# Patient Record
Sex: MALE | Race: ASIAN | NOT HISPANIC OR LATINO
[De-identification: names, ages, dates, MRNs, and addresses within clinical notes are randomized per-mention and may not be internally consistent; named-entity substitution may affect disease eponyms.]

---

## 2021-10-04 ENCOUNTER — APPOINTMENT (OUTPATIENT)
Dept: CARDIOLOGY | Facility: CLINIC | Age: 61
End: 2021-10-04

## 2021-10-26 ENCOUNTER — APPOINTMENT (OUTPATIENT)
Dept: UROLOGY | Facility: CLINIC | Age: 61
End: 2021-10-26
Payer: COMMERCIAL

## 2021-10-26 VITALS
WEIGHT: 204 LBS | RESPIRATION RATE: 16 BRPM | HEIGHT: 70 IN | TEMPERATURE: 98 F | OXYGEN SATURATION: 99 % | HEART RATE: 54 BPM | SYSTOLIC BLOOD PRESSURE: 112 MMHG | BODY MASS INDEX: 29.2 KG/M2 | DIASTOLIC BLOOD PRESSURE: 70 MMHG

## 2021-10-26 DIAGNOSIS — Z87.891 PERSONAL HISTORY OF NICOTINE DEPENDENCE: ICD-10-CM

## 2021-10-26 DIAGNOSIS — Z78.9 OTHER SPECIFIED HEALTH STATUS: ICD-10-CM

## 2021-10-26 LAB
ANION GAP SERPL CALC-SCNC: 14 MMOL/L
APPEARANCE: CLEAR
BACTERIA: NEGATIVE
BILIRUBIN URINE: NEGATIVE
BLOOD URINE: NEGATIVE
BUN SERPL-MCNC: 17 MG/DL
CALCIUM SERPL-MCNC: 9.3 MG/DL
CHLORIDE SERPL-SCNC: 103 MMOL/L
CO2 SERPL-SCNC: 22 MMOL/L
COLOR: COLORLESS
CREAT SERPL-MCNC: 0.64 MG/DL
ESTRADIOL SERPL-MCNC: 17 PG/ML
GLUCOSE QUALITATIVE U: NEGATIVE
GLUCOSE SERPL-MCNC: 92 MG/DL
HYALINE CASTS: 0 /LPF
KETONES URINE: NEGATIVE
LEUKOCYTE ESTERASE URINE: NEGATIVE
LH SERPL-ACNC: 6.6 IU/L
MICROSCOPIC-UA: NORMAL
NITRITE URINE: NEGATIVE
PH URINE: 6
POTASSIUM SERPL-SCNC: 4.2 MMOL/L
PROLACTIN SERPL-MCNC: 10.3 NG/ML
PROTEIN URINE: NEGATIVE
PSA FREE FLD-MCNC: 18 %
PSA FREE SERPL-MCNC: 0.66 NG/ML
PSA SERPL-MCNC: 3.69 NG/ML
RED BLOOD CELLS URINE: 0 /HPF
SODIUM SERPL-SCNC: 140 MMOL/L
SPECIFIC GRAVITY URINE: 1.01
SQUAMOUS EPITHELIAL CELLS: 0 /HPF
UROBILINOGEN URINE: NORMAL
WHITE BLOOD CELLS URINE: 0 /HPF

## 2021-10-26 PROCEDURE — 99204 OFFICE O/P NEW MOD 45 MIN: CPT

## 2021-10-26 RX ORDER — LOSARTAN POTASSIUM 100 MG/1
100 TABLET, FILM COATED ORAL
Refills: 0 | Status: ACTIVE | COMMUNITY

## 2021-10-26 NOTE — ADDENDUM
[FreeTextEntry1] : Entered by Brandee Carter, acting as scribe for Dr. Richard Jacques.\par \par The documentation recorded by the scribe accurately reflects the service I personally performed and the decisions made by me.

## 2021-10-26 NOTE — LETTER BODY
[FreeTextEntry1] : Daniel Johnson MD \par [address], \[place] [address]\par [phone]\par \par Dear Dr. Johnson,\par \par Reason for Visit: BPH. Urinary urgency. Erectile dysfunction.\par \par This is a 60 year-old Mandarin-speaking gentleman with symptoms of BPH and erectile dysfunction. Patient is here today for evaluation. Patient reports he has weak uroflow, frequency, and urinary urgency. He denies any hematuria or urinary incontinence. His symptoms are aggravated by hydration. He denies any alleviating factors. He has not tried any medical therapy previously. He denies any pain. Patient also reports erectile dysfunction. He has normal libido and sex drive. However he notes decreased penile tumescence. He is currently taking Losartan for his blood pressure. All other review of systems are negative. He has no cancer in his family medical history. He has previously undergone an appendectomy.  Past medical history, family history and social history were inquired and were noncontributory to current condition. The patient does not use tobacco or drink alcohol. Medications and allergies were reviewed. He has no known allergies to medication. \par \par On examination, the patient is a healthy-appearing gentleman in no acute distress. He is alert and oriented follows commands. He has normal mood and affect. He is normocephalic. Neck is supple. Oral no thrush Respirations are unlabored. His abdomen is soft and nontender. Bladder is nonpalpable. No CVA tenderness. Neurologically he is grossly intact. No peripheral edema. Skin without gross abnormality. He has normal male external genitalia. Normal meatus. Bilateral testes are descended intrascrotally and normal to palpation. On rectal examination, there is normal sphincter tone. The prostate is clinically benign without focal induration or nodularity. \par \par ASSESSMENT: BPH. Urinary urgency. Erectile dysfunction.\par \par I counseled the patient on the various etiology of his symptoms. I discussed the natural history of BPH and the treatment options available. I discussed the options of conservative management with fluid in dietary restrictions, herbal therapy, medical therapy, and minimally invasive procedures.  Risk and benefits were discussed. I answered his questions. I recommended he try Flomax. I discussed the potential side effects of the medication. I counseled the patient on its use and side effects. If the patient develops any side effects, the patient will discontinue the medication and contact me. He will obtain BMP, PSA and urinalysis to establish baselines. In terms of his erectile dysfunction, I counseled the patient. I discussed the various etiologies of erectile dysfunction. I recommended that he obtained a male hormone panel with testosterone, free testosterone, estradiol, prolactin, and LH level. Risks and alternatives were discussed. I answered the patient questions. The patient will follow-up as directed and will contact me with any questions or concerns. Thank you for the opportunity to participate in the care of Mr. CHOWDHURY. I will keep you updated on his progress.\par \par Plan: Trial of Flomax. Male hormone panel. BMP. PSA. Urinalysis. Follow up in 1 month.

## 2021-10-26 NOTE — HISTORY OF PRESENT ILLNESS
[FreeTextEntry1] : Please refer to URO Consult note \par \par new male pt \par BPH and ED \par nocturia frequent urination - trial of Flomax \par ED blood pressure meds \par male hormone panel \par consider Viagra \par

## 2021-10-27 LAB
TESTOST BND SERPL-MCNC: 15.6 PG/ML
TESTOSTERONE TOTAL S: 647 NG/DL

## 2021-11-30 ENCOUNTER — APPOINTMENT (OUTPATIENT)
Dept: UROLOGY | Facility: CLINIC | Age: 61
End: 2021-11-30
Payer: COMMERCIAL

## 2021-11-30 VITALS
WEIGHT: 204 LBS | HEART RATE: 65 BPM | RESPIRATION RATE: 16 BRPM | DIASTOLIC BLOOD PRESSURE: 74 MMHG | BODY MASS INDEX: 29.27 KG/M2 | TEMPERATURE: 97.7 F | SYSTOLIC BLOOD PRESSURE: 124 MMHG | OXYGEN SATURATION: 98 %

## 2021-11-30 DIAGNOSIS — Z00.00 ENCOUNTER FOR GENERAL ADULT MEDICAL EXAMINATION W/OUT ABNORMAL FINDINGS: ICD-10-CM

## 2021-11-30 PROCEDURE — 99214 OFFICE O/P EST MOD 30 MIN: CPT

## 2021-11-30 NOTE — HISTORY OF PRESENT ILLNESS
[FreeTextEntry1] : Please refer to URO Consult note \par \par FUA BPH \par symptoms improved on Flomax \par ED -  male hormone panel normal \par trial of Viagra \par come back in 1 yr \par

## 2021-11-30 NOTE — LETTER BODY
[FreeTextEntry1] : Daniel Johnson MD \par [address], \[place] [address]\par [phone]\par \par Dear Dr. Johnson,\par \par Reason for Visit: BPH. Urinary urgency. Erectile dysfunction.\par \par This is a 61 year-old Mandarin-speaking gentleman with symptoms of BPH and erectile dysfunction. Patient returns today for follow up. Since he was last seen, the patient reports taking Flomax regularly without any side effects or difficulties with the medication. He reports good uroflow and improved urinary symptoms.  He denies any hematuria or urinary incontinence. He denies any pain. Patient also reports erectile dysfunction. He has normal libido and sex drive. However he notes decreased penile tumescence. His male hormone panel was unremarkable. He is currently taking Losartan for his blood pressure.  All other review of systems are negative. Past medical history, family history and social history were inquired and were noncontributory to current condition. Medications and allergies were reviewed. He has no known allergies to medication. \par \par On examination, the patient is a healthy-appearing gentleman in no acute distress. He is alert and oriented follows commands. He has normal mood and affect. He is normocephalic. Neck is supple. Oral no thrush Respirations are unlabored. His abdomen is soft and nontender. Bladder is nonpalpable. No CVA tenderness. Neurologically he is grossly intact. No peripheral edema. Skin without gross abnormality. He has normal male external genitalia. Normal meatus. Bilateral testes are descended intrascrotally and normal to palpation. On rectal examination, there is normal sphincter tone. The prostate is clinically benign without focal induration or nodularity. \par \par His BMP demonstrated normal renal functions, creatinine 0.64. His PSA was 3.69, which is within normal limits. His urinalysis was unremarkable. His male hormone panel was unremarkable. \par \par ASSESSMENT: BPH, symptoms improved on Flomax. Erectile dysfunction.\par \par I counseled the patient. In terms of his BPH, the patient reports improved urinary symptoms on medical therapy. I recommended he continue taking Flomax. I renewed the patient's prescription for Flomax today. I encouraged the patient to continue medications regularly as directed.  In terms of his erectile dysfunction, his male hormone panel was unremarkable. I recommended the patient begin a trial of Viagra PRN. I discussed the potential side effects of the medication. I counseled the patient on its use and side effects. If the patient develops any side effects, the patient will discontinue the medication and contact me. Risks and alternatives were discussed. I answered the patient questions. The patient will follow-up as directed and will contact me with any questions or concerns. Thank you for the opportunity to participate in the care of Mr. CHOWDHURY. I will keep you updated on his progress. \par \par Plan: Trial of Viagra PRN. Continue Flomax. Follow up as directed.

## 2022-03-29 ENCOUNTER — APPOINTMENT (OUTPATIENT)
Dept: CARDIOLOGY | Facility: CLINIC | Age: 62
End: 2022-03-29
Payer: COMMERCIAL

## 2022-03-29 VITALS
BODY MASS INDEX: 29.7 KG/M2 | HEART RATE: 75 BPM | SYSTOLIC BLOOD PRESSURE: 141 MMHG | WEIGHT: 207 LBS | RESPIRATION RATE: 18 BRPM | DIASTOLIC BLOOD PRESSURE: 83 MMHG | TEMPERATURE: 97.3 F | OXYGEN SATURATION: 98 %

## 2022-03-29 DIAGNOSIS — R00.2 PALPITATIONS: ICD-10-CM

## 2022-03-29 DIAGNOSIS — I10 ESSENTIAL (PRIMARY) HYPERTENSION: ICD-10-CM

## 2022-03-29 PROCEDURE — 99203 OFFICE O/P NEW LOW 30 MIN: CPT

## 2022-03-29 NOTE — HISTORY OF PRESENT ILLNESS
[FreeTextEntry1] : 61 year-old male with HTN presents for evaluation of palpitations. Patient is on Losartan 100 mg and was given Amlodipine 5 mg today due to elevated BP. Patient denies CP. Patient denies SOB. Patient denies h/o syncope. Patient saw Carina Rubio 2 weeks ago and underwent echo which was negative and 6 - day Monitor. Patient reports palpitations lasting 10 minutes. He reports snoring at night with dry mouth. He has trace edema. I advised patient to consider changing Amlodipine to HCTZ 12.5 mg.

## 2023-10-06 ENCOUNTER — APPOINTMENT (OUTPATIENT)
Dept: UROLOGY | Facility: CLINIC | Age: 63
End: 2023-10-06
Payer: COMMERCIAL

## 2023-10-06 VITALS
OXYGEN SATURATION: 95 % | RESPIRATION RATE: 18 BRPM | HEART RATE: 62 BPM | TEMPERATURE: 97.5 F | SYSTOLIC BLOOD PRESSURE: 150 MMHG | WEIGHT: 207 LBS | BODY MASS INDEX: 29.7 KG/M2 | DIASTOLIC BLOOD PRESSURE: 91 MMHG

## 2023-10-06 DIAGNOSIS — N40.1 BENIGN PROSTATIC HYPERPLASIA WITH LOWER URINARY TRACT SYMPMS: ICD-10-CM

## 2023-10-06 DIAGNOSIS — N13.8 BENIGN PROSTATIC HYPERPLASIA WITH LOWER URINARY TRACT SYMPMS: ICD-10-CM

## 2023-10-06 DIAGNOSIS — R39.12 POOR URINARY STREAM: ICD-10-CM

## 2023-10-06 PROCEDURE — 99214 OFFICE O/P EST MOD 30 MIN: CPT

## 2023-10-06 RX ORDER — TAMSULOSIN HYDROCHLORIDE 0.4 MG/1
0.4 CAPSULE ORAL
Qty: 90 | Refills: 3 | Status: ACTIVE | COMMUNITY
Start: 2021-10-26 | End: 1900-01-01

## 2023-10-06 RX ORDER — SILDENAFIL 20 MG/1
20 TABLET ORAL
Qty: 30 | Refills: 3 | Status: ACTIVE | COMMUNITY
Start: 2021-11-30 | End: 1900-01-01

## 2023-10-07 LAB
ANION GAP SERPL CALC-SCNC: 11 MMOL/L
BUN SERPL-MCNC: 18 MG/DL
CALCIUM SERPL-MCNC: 9.5 MG/DL
CHLORIDE SERPL-SCNC: 100 MMOL/L
CO2 SERPL-SCNC: 27 MMOL/L
CREAT SERPL-MCNC: 0.7 MG/DL
EGFR: 104 ML/MIN/1.73M2
GLUCOSE SERPL-MCNC: 103 MG/DL
POTASSIUM SERPL-SCNC: 4 MMOL/L
PSA FREE FLD-MCNC: 18 %
PSA FREE SERPL-MCNC: 0.77 NG/ML
PSA SERPL-MCNC: 4.36 NG/ML
SODIUM SERPL-SCNC: 138 MMOL/L

## 2023-10-27 ENCOUNTER — NON-APPOINTMENT (OUTPATIENT)
Age: 63
End: 2023-10-27

## 2023-10-27 DIAGNOSIS — F52.21 MALE ERECTILE DISORDER: ICD-10-CM

## 2023-11-19 DIAGNOSIS — R97.20 ELEVATED PROSTATE, SPECIFIC ANTIGEN [PSA]: ICD-10-CM

## 2023-11-20 ENCOUNTER — APPOINTMENT (OUTPATIENT)
Dept: UROLOGY | Facility: CLINIC | Age: 63
End: 2023-11-20
Payer: COMMERCIAL

## 2023-11-20 ENCOUNTER — OUTPATIENT (OUTPATIENT)
Dept: OUTPATIENT SERVICES | Facility: HOSPITAL | Age: 63
LOS: 1 days | End: 2023-11-20
Payer: COMMERCIAL

## 2023-11-20 VITALS — TEMPERATURE: 97.9 F | HEART RATE: 62 BPM | DIASTOLIC BLOOD PRESSURE: 66 MMHG | SYSTOLIC BLOOD PRESSURE: 114 MMHG

## 2023-11-20 VITALS — DIASTOLIC BLOOD PRESSURE: 58 MMHG | SYSTOLIC BLOOD PRESSURE: 95 MMHG

## 2023-11-20 DIAGNOSIS — R35.0 FREQUENCY OF MICTURITION: ICD-10-CM

## 2023-11-20 PROCEDURE — 55700: CPT | Mod: 22

## 2023-11-20 PROCEDURE — 76377 3D RENDER W/INTRP POSTPROCES: CPT | Mod: 26

## 2023-11-20 PROCEDURE — 76942 ECHO GUIDE FOR BIOPSY: CPT | Mod: 26,59

## 2023-11-20 PROCEDURE — 55700: CPT

## 2023-11-20 PROCEDURE — 76942 ECHO GUIDE FOR BIOPSY: CPT | Mod: 59

## 2023-11-21 ENCOUNTER — OUTPATIENT (OUTPATIENT)
Dept: OUTPATIENT SERVICES | Facility: HOSPITAL | Age: 63
LOS: 1 days | End: 2023-11-21

## 2023-11-21 DIAGNOSIS — R97.20 ELEVATED PROSTATE SPECIFIC ANTIGEN [PSA]: ICD-10-CM

## 2023-11-21 DIAGNOSIS — R39.12 POOR URINARY STREAM: ICD-10-CM

## 2023-11-21 DIAGNOSIS — N40.1 BENIGN PROSTATIC HYPERPLASIA WITH LOWER URINARY TRACT SYMPTOMS: ICD-10-CM

## 2023-11-21 DIAGNOSIS — F52.21 MALE ERECTILE DISORDER: ICD-10-CM

## 2023-11-21 PROCEDURE — C8001: CPT

## 2023-11-21 RX ORDER — SODIUM PHOSPHATE, DIBASIC AND SODIUM PHOSPHATE, MONOBASIC 7; 19 G/230ML; G/230ML
ENEMA RECTAL
Qty: 1 | Refills: 0 | Status: COMPLETED | COMMUNITY
Start: 2023-10-27 | End: 2023-11-20

## 2023-11-21 RX ORDER — HYDROCHLOROTHIAZIDE 25 MG/1
25 TABLET ORAL DAILY
Refills: 0 | Status: ACTIVE | COMMUNITY
Start: 2023-11-21

## 2023-11-21 RX ORDER — SODIUM PHOSPHATE, DIBASIC AND SODIUM PHOSPHATE, MONOBASIC 7; 19 G/230ML; G/230ML
ENEMA RECTAL
Qty: 1 | Refills: 0 | Status: COMPLETED | COMMUNITY
Start: 2023-10-07 | End: 2023-11-20

## 2023-11-24 LAB — PROSTATE BIOPSY: NORMAL

## 2023-11-30 ENCOUNTER — APPOINTMENT (OUTPATIENT)
Dept: UROLOGY | Facility: CLINIC | Age: 63
End: 2023-11-30
Payer: COMMERCIAL

## 2023-11-30 PROCEDURE — 99214 OFFICE O/P EST MOD 30 MIN: CPT

## 2023-12-14 ENCOUNTER — APPOINTMENT (OUTPATIENT)
Dept: UROLOGY | Facility: CLINIC | Age: 63
End: 2023-12-14
Payer: COMMERCIAL

## 2023-12-14 DIAGNOSIS — C61 MALIGNANT NEOPLASM OF PROSTATE: ICD-10-CM

## 2023-12-14 PROCEDURE — 99215 OFFICE O/P EST HI 40 MIN: CPT

## 2023-12-21 ENCOUNTER — RESULT REVIEW (OUTPATIENT)
Age: 63
End: 2023-12-21

## 2023-12-26 ENCOUNTER — OUTPATIENT (OUTPATIENT)
Dept: OUTPATIENT SERVICES | Facility: HOSPITAL | Age: 63
LOS: 1 days | End: 2023-12-26
Payer: COMMERCIAL

## 2023-12-26 ENCOUNTER — APPOINTMENT (OUTPATIENT)
Dept: CT IMAGING | Facility: IMAGING CENTER | Age: 63
End: 2023-12-26
Payer: COMMERCIAL

## 2023-12-26 DIAGNOSIS — C61 MALIGNANT NEOPLASM OF PROSTATE: ICD-10-CM

## 2023-12-26 PROCEDURE — 74177 CT ABD & PELVIS W/CONTRAST: CPT | Mod: 26

## 2023-12-26 PROCEDURE — 74177 CT ABD & PELVIS W/CONTRAST: CPT

## 2023-12-26 NOTE — HISTORY OF PRESENT ILLNESS
[FreeTextEntry1] : Referred by Dr Jacques   Mr TRENTON BRUNO is a 63-year-old male who was recently diagnosed with Grade 2 prostate cancer. Here today to discuss radical prostatectomy.  Prostate MR 11/27/2023 Volume 56.1 ml PIRADS 4 lesion  Biopsy 11/24/2023 2 cores Aaron 3+4=7, grade 2 lesions  Urinary Function: frequency, urinary hesitancy, nocturia

## 2023-12-26 NOTE — DISEASE MANAGEMENT
[1] : T1 [c] : c [0-10] : 0 -10 ng/mL [Biopsy with Fusion] : Patient had a biopsy with fusion on [7(3+4)] : Fusion Biopsy [place] Score: 7(3+4) [Biopsy results sent to PCP/Referring Physician] : Biopsy results sent to PCP/Referring Physician [] : Patient had a Prostate MRI [4] : 4 [BiopsyDate] : 11/19/2023 [TotalCores] : 13 [TotalPositiveCores] : 2 [MaxCoreInvolvement] : 40% [FreeTextEntry7] : MRI prostate 10/27/2023: Right PZpm PIRAD 4 [IIB] : IIB

## 2023-12-26 NOTE — REASON FOR VISIT
[Consideration of Curative Therapy] : consideration of curative therapy for prostate cancer [Family Member] : family member [[employer] ] : provided by [employer]   [Interpreters_IDNumber] : [id number] [Interpreters_FullName] : Caryn

## 2023-12-26 NOTE — REASON FOR VISIT
[Consideration of Curative Therapy] : consideration of curative therapy for prostate cancer [Family Member] : family member [[employer] ] : provided by [employer]   [Interpreters_IDNumber] : [id number] [Interpreters_FullName] : Caryn

## 2023-12-26 NOTE — DISEASE MANAGEMENT
[1] : T1 [c] : c [0-10] : 0 -10 ng/mL [Biopsy with Fusion] : Patient had a biopsy with fusion on [7(3+4)] : Fusion Biopsy [place] Score: 7(3+4) [Biopsy results sent to PCP/Referring Physician] : Biopsy results sent to PCP/Referring Physician [] : Patient had a Prostate MRI [4] : 4 [BiopsyDate] : 11/19/2023 [TotalCores] : 13 [TotalPositiveCores] : 2 [MaxCoreInvolvement] : 40% [FreeTextEntry7] : MRI prostate 10/27/2023: Right PZpm PIRAD 4 [IIB] : IIB

## 2024-01-15 ENCOUNTER — NON-APPOINTMENT (OUTPATIENT)
Age: 64
End: 2024-01-15

## 2024-08-05 ENCOUNTER — NON-APPOINTMENT (OUTPATIENT)
Age: 64
End: 2024-08-05

## 2024-08-06 ENCOUNTER — APPOINTMENT (OUTPATIENT)
Dept: UROLOGY | Facility: CLINIC | Age: 64
End: 2024-08-06

## 2024-08-06 PROCEDURE — G2211 COMPLEX E/M VISIT ADD ON: CPT

## 2024-08-06 PROCEDURE — 99214 OFFICE O/P EST MOD 30 MIN: CPT

## 2024-08-06 NOTE — LETTER BODY
[FreeTextEntry1] : Daniel Johnson MD [address] [address] [place] [address] - [address] [place] [address] [phone]  Dear Dr. Johnson,  Reason for visit: Prostate cancer. BPH.  This is a 63 year-year-old gentleman with prostate cancer on active surveillance and BPH. His prostate biopsy in 2023 demonstrated evidence of Aaron 7 adenocarcinoma of the prostate involving 3 out of 15 cores. Patient returns today for follow-up.  Patient saw Dr. Almanzar but declined surgical intervention.  He is interested in pursuing active surveillance.  He is not interested in radiation therapy either currently.  Since he was last seen, the patient reports taking Flomax QD.  Patient reports taking the medications regularly without any side effects or difficulties with the medication. He notes progressive symptoms despite medical therapy. He denies any fevers or chills. He denies any pain. Patient denies any changes in health. The past medical history and family history and social history are unchanged. All other review of systems are negative. Patient denies any changes in medications. Medication list was reconciled. Patient is companied by his wife and daughter today.  On examination, the patient is a healthy-appearing gentleman in no acute distress. He is alert and oriented follows commands. He has normal mood and affect. He is normocephalic. Oral no thrush. Neck is supple. Respirations are unlabored. His abdomen is soft and nontender. Liver is nonpalpable. Bladder is nonpalpable. No CVA tenderness. Neurologically he is grossly intact. No peripheral edema. Skin without gross abnormality.   His biopsy PSA was 4.36.   Post-void residual on bladder scan today was 150 cc.   Assessment: Prostate cancer. [place] grade group prostate cancer. BPH.  I counseled the patient. In terms of his BPH, the patient reports progressive symptoms despite medical therapy. His PVR today was 150 cc.  Patient has not meet treatment goal.  I recommended the patient increase Flomax to BID.  I renewed the patient's prescription for Flomax BID today. I encouraged the patient to continue medications regularly as directed. In terms of his prostate cancer, the patient declines any intervention at this time.  I recommend that the patient continue with active surveillance and follow-up regularly for serial PSA screening and TEVIN to ensure stability. He will repeat PSA and BMP today. He will also obtain urinalysis, urine culture, and urine cytology to look for infections and high grade urothelial carcinoma. Risks and benefits were discussed. Alternatives were given. I answered the patient questions. The patient wishes to consider the procedure and discuss with his family. The patient will follow-up as directed and will contact me with any questions or concerns or he has made a decision regarding the treatment of his prostate cancer.  Plan: Increase Flomax to BID. PSA. BMP. PVR. Urinalysis. Urine cytology. Urine culture. Follow up in 6 months.   I spent 30-minutes time today on all issues related to this patient on today date of service including non face to face time.

## 2024-08-06 NOTE — ADDENDUM
[FreeTextEntry1] : Entered by Blanco Neely, acting as scribe for Dr. Richard Jacques. The documentation recorded by the scribe accurately reflects the service I personally performed and the decisions made by me.

## 2024-09-06 ENCOUNTER — APPOINTMENT (OUTPATIENT)
Dept: UROLOGY | Facility: CLINIC | Age: 64
End: 2024-09-06
Payer: COMMERCIAL

## 2024-09-06 VITALS
OXYGEN SATURATION: 98 % | BODY MASS INDEX: 30.13 KG/M2 | HEART RATE: 59 BPM | SYSTOLIC BLOOD PRESSURE: 110 MMHG | TEMPERATURE: 97.7 F | WEIGHT: 210 LBS | RESPIRATION RATE: 16 BRPM | DIASTOLIC BLOOD PRESSURE: 72 MMHG

## 2024-09-06 DIAGNOSIS — C61 MALIGNANT NEOPLASM OF PROSTATE: ICD-10-CM

## 2024-09-06 DIAGNOSIS — R97.20 ELEVATED PROSTATE, SPECIFIC ANTIGEN [PSA]: ICD-10-CM

## 2024-09-06 PROCEDURE — G2211 COMPLEX E/M VISIT ADD ON: CPT

## 2024-09-06 PROCEDURE — 51798 US URINE CAPACITY MEASURE: CPT

## 2024-09-06 PROCEDURE — 99214 OFFICE O/P EST MOD 30 MIN: CPT

## 2024-09-06 RX ORDER — SODIUM PHOSPHATE, DIBASIC AND SODIUM PHOSPHATE, MONOBASIC 7; 19 G/230ML; G/230ML
ENEMA RECTAL
Qty: 1 | Refills: 0 | Status: ACTIVE | COMMUNITY
Start: 2024-09-06 | End: 1900-01-01

## 2024-09-06 NOTE — LETTER BODY
[FreeTextEntry1] : Daniel Johnson MD [address] [address] [place] [address] - [address] [place] [address] [phone]  Dear Dr. Johnson,  Reason for visit: Prostate cancer. BPH.  This is a 63-year-old gentleman with prostate cancer on active surveillance and BPH. His prostate biopsy in 2023 demonstrated evidence of [place] 7 adenocarcinoma of the prostate involving 3 out of 15 cores. Patient saw Dr. Almanzar but declined surgical intervention. Patient returns today for follow-up.  Since he was last seen, the patient reports taking Flomax BID. Patient reports taking the medications regularly without any side effects or difficulties with the medication. He notes stable symptoms on medical therapy. He denies any fevers or chills. He denies any pain. Patient denies any changes in health. The past medical history and family history and social history are unchanged. All other review of systems are negative. Patient denies any changes in medications. Medication list was reconciled. Patient is companied by his wife and daughter today.  On examination, the patient is a healthy-appearing gentleman in no acute distress. He is alert and oriented follows commands. He has normal mood and affect. He is normocephalic. Oral no thrush. Neck is supple. Respirations are unlabored. His abdomen is soft and nontender. Liver is nonpalpable. Bladder is nonpalpable. No CVA tenderness. Neurologically he is grossly intact. No peripheral edema. Skin without gross abnormality.   His biopsy PSA was 4.36. His most recent PSA was 5.40, which is elevated.   Post-void residual on bladder scan today was 80 cc, improved.   Assessment: Prostate cancer. Stow grade group 2 prostate cancer. BPH.  I counseled the patient. In terms of his BPH, the patient reports stable symptoms with Flomax BID. His PVR today was 80 cc which has improved from previous evaluation.  I renewed the patient's prescription for Flomax BID today. I encouraged the patient to continue medications regularly as directed. In terms of his prostate cancer, the patient declines any surgical intervention at this time due to previous surgical incisions. He is interested in watchful waiting. His previous PSA was elevated, 5.40. I recommended the patient repeat PSA and BMP to establish baseline. I recommended the patient repeat prostate MRI for fusion targeted prostate biopsy. I counseled the patient regarding the procedure. The risks and benefits were discussed. Alternatives were given. I answered the patient's questions. The patient will take the necessary preparations for the procedure, including fleet enema. The patient will follow-up as directed and will contact me with any questions or concerns, or he has made a decision regarding the treatment of his prostate cancer. Thank you for the opportunity to participate in the care of this patient, I will keep you updated on his progress.  Plan: Continue Flomax BID. PSA. BMP. PVR. Prostate MRI for fusion biopsy. Follow up as directed.  I spent 30-minutes time today on all issues related to this patient on today date of service including non face to face time
2 weeks

## 2024-09-07 LAB
ANION GAP SERPL CALC-SCNC: 15 MMOL/L
APPEARANCE: CLEAR
BACTERIA: NEGATIVE /HPF
BILIRUBIN URINE: NEGATIVE
BLOOD URINE: NEGATIVE
BUN SERPL-MCNC: 15 MG/DL
CALCIUM SERPL-MCNC: 8.7 MG/DL
CAST: 0 /LPF
CHLORIDE SERPL-SCNC: 101 MMOL/L
CO2 SERPL-SCNC: 25 MMOL/L
COLOR: YELLOW
CREAT SERPL-MCNC: 0.79 MG/DL
EGFR: 100 ML/MIN/1.73M2
EPITHELIAL CELLS: 1 /HPF
GLUCOSE QUALITATIVE U: NEGATIVE MG/DL
GLUCOSE SERPL-MCNC: 92 MG/DL
KETONES URINE: NEGATIVE MG/DL
LEUKOCYTE ESTERASE URINE: ABNORMAL
MICROSCOPIC-UA: NORMAL
NITRITE URINE: NEGATIVE
PH URINE: 6
POTASSIUM SERPL-SCNC: 3.8 MMOL/L
PROTEIN URINE: NEGATIVE MG/DL
PSA FREE FLD-MCNC: 15 %
PSA FREE SERPL-MCNC: 0.83 NG/ML
PSA SERPL-MCNC: 5.59 NG/ML
RED BLOOD CELLS URINE: 1 /HPF
SODIUM SERPL-SCNC: 141 MMOL/L
SPECIFIC GRAVITY URINE: 1.01
UROBILINOGEN URINE: 0.2 MG/DL
WHITE BLOOD CELLS URINE: 3 /HPF

## 2024-09-08 LAB — BACTERIA UR CULT: NORMAL

## 2024-09-10 ENCOUNTER — NON-APPOINTMENT (OUTPATIENT)
Age: 64
End: 2024-09-10

## 2024-10-01 ENCOUNTER — NON-APPOINTMENT (OUTPATIENT)
Age: 64
End: 2024-10-01

## 2024-10-04 ENCOUNTER — NON-APPOINTMENT (OUTPATIENT)
Age: 64
End: 2024-10-04

## 2024-10-04 DIAGNOSIS — R97.20 ELEVATED PROSTATE, SPECIFIC ANTIGEN [PSA]: ICD-10-CM

## 2024-10-04 RX ORDER — SODIUM PHOSPHATE, DIBASIC AND SODIUM PHOSPHATE, MONOBASIC 7; 19 G/230ML; G/230ML
ENEMA RECTAL
Qty: 1 | Refills: 0 | Status: ACTIVE | COMMUNITY
Start: 2024-10-04 | End: 1900-01-01

## 2024-10-30 ENCOUNTER — OUTPATIENT (OUTPATIENT)
Dept: OUTPATIENT SERVICES | Facility: HOSPITAL | Age: 64
LOS: 1 days | End: 2024-10-30
Payer: COMMERCIAL

## 2024-10-30 DIAGNOSIS — R97.20 ELEVATED PROSTATE SPECIFIC ANTIGEN [PSA]: ICD-10-CM

## 2024-10-30 PROCEDURE — C8001: CPT

## 2024-11-02 DIAGNOSIS — C61 MALIGNANT NEOPLASM OF PROSTATE: ICD-10-CM

## 2024-11-04 ENCOUNTER — OUTPATIENT (OUTPATIENT)
Dept: OUTPATIENT SERVICES | Facility: HOSPITAL | Age: 64
LOS: 1 days | End: 2024-11-04
Payer: COMMERCIAL

## 2024-11-04 ENCOUNTER — APPOINTMENT (OUTPATIENT)
Dept: UROLOGY | Facility: CLINIC | Age: 64
End: 2024-11-04
Payer: COMMERCIAL

## 2024-11-04 VITALS — DIASTOLIC BLOOD PRESSURE: 79 MMHG | HEART RATE: 51 BPM | SYSTOLIC BLOOD PRESSURE: 130 MMHG | OXYGEN SATURATION: 94 %

## 2024-11-04 VITALS — SYSTOLIC BLOOD PRESSURE: 145 MMHG | HEART RATE: 55 BPM | OXYGEN SATURATION: 98 % | DIASTOLIC BLOOD PRESSURE: 86 MMHG

## 2024-11-04 DIAGNOSIS — R35.0 FREQUENCY OF MICTURITION: ICD-10-CM

## 2024-11-04 PROCEDURE — 55700: CPT

## 2024-11-04 PROCEDURE — 76999F: CUSTOM | Mod: 26

## 2024-11-04 PROCEDURE — 76999 ECHO EXAMINATION PROCEDURE: CPT

## 2024-11-05 DIAGNOSIS — R97.20 ELEVATED PROSTATE SPECIFIC ANTIGEN [PSA]: ICD-10-CM

## 2024-11-06 ENCOUNTER — NON-APPOINTMENT (OUTPATIENT)
Age: 64
End: 2024-11-06

## 2024-11-07 LAB — PROSTATE BIOPSY: NORMAL

## 2024-11-11 ENCOUNTER — APPOINTMENT (OUTPATIENT)
Dept: UROLOGY | Facility: CLINIC | Age: 64
End: 2024-11-11
Payer: COMMERCIAL

## 2024-11-11 VITALS — SYSTOLIC BLOOD PRESSURE: 110 MMHG | DIASTOLIC BLOOD PRESSURE: 72 MMHG

## 2024-11-11 PROCEDURE — 99214 OFFICE O/P EST MOD 30 MIN: CPT

## 2024-11-11 PROCEDURE — G2211 COMPLEX E/M VISIT ADD ON: CPT

## 2025-04-11 ENCOUNTER — NON-APPOINTMENT (OUTPATIENT)
Age: 65
End: 2025-04-11

## 2025-04-11 ENCOUNTER — APPOINTMENT (OUTPATIENT)
Dept: CARDIOLOGY | Facility: CLINIC | Age: 65
End: 2025-04-11
Payer: COMMERCIAL

## 2025-04-11 VITALS
DIASTOLIC BLOOD PRESSURE: 81 MMHG | HEART RATE: 51 BPM | WEIGHT: 211 LBS | SYSTOLIC BLOOD PRESSURE: 133 MMHG | BODY MASS INDEX: 30.28 KG/M2 | RESPIRATION RATE: 18 BRPM | OXYGEN SATURATION: 96 %

## 2025-04-11 DIAGNOSIS — R60.9 EDEMA, UNSPECIFIED: ICD-10-CM

## 2025-04-11 DIAGNOSIS — I10 ESSENTIAL (PRIMARY) HYPERTENSION: ICD-10-CM

## 2025-04-11 DIAGNOSIS — R00.1 BRADYCARDIA, UNSPECIFIED: ICD-10-CM

## 2025-04-11 DIAGNOSIS — R00.2 PALPITATIONS: ICD-10-CM

## 2025-04-11 PROCEDURE — 99214 OFFICE O/P EST MOD 30 MIN: CPT | Mod: 25

## 2025-04-11 PROCEDURE — 93000 ELECTROCARDIOGRAM COMPLETE: CPT

## 2025-04-18 ENCOUNTER — APPOINTMENT (OUTPATIENT)
Dept: CARDIOLOGY | Facility: CLINIC | Age: 65
End: 2025-04-18

## 2025-04-18 VITALS — SYSTOLIC BLOOD PRESSURE: 115 MMHG | DIASTOLIC BLOOD PRESSURE: 72 MMHG

## 2025-04-18 PROCEDURE — 93306 TTE W/DOPPLER COMPLETE: CPT

## 2025-04-30 ENCOUNTER — NON-APPOINTMENT (OUTPATIENT)
Age: 65
End: 2025-04-30